# Patient Record
Sex: MALE | Race: WHITE
[De-identification: names, ages, dates, MRNs, and addresses within clinical notes are randomized per-mention and may not be internally consistent; named-entity substitution may affect disease eponyms.]

---

## 2020-10-19 ENCOUNTER — HOSPITAL ENCOUNTER (INPATIENT)
Dept: HOSPITAL 74 - YASAS | Age: 25
LOS: 2 days | Discharge: LEFT BEFORE DISCHARGE | DRG: 770 | End: 2020-10-21
Attending: ALLERGY & IMMUNOLOGY | Admitting: ALLERGY & IMMUNOLOGY
Payer: COMMERCIAL

## 2020-10-19 VITALS — BODY MASS INDEX: 21.2 KG/M2

## 2020-10-19 DIAGNOSIS — F10.220: ICD-10-CM

## 2020-10-19 DIAGNOSIS — F17.210: ICD-10-CM

## 2020-10-19 DIAGNOSIS — F14.20: ICD-10-CM

## 2020-10-19 DIAGNOSIS — F12.20: ICD-10-CM

## 2020-10-19 DIAGNOSIS — F32.9: ICD-10-CM

## 2020-10-19 DIAGNOSIS — F10.230: Primary | ICD-10-CM

## 2020-10-19 DIAGNOSIS — D72.829: ICD-10-CM

## 2020-10-19 DIAGNOSIS — F41.9: ICD-10-CM

## 2020-10-19 DIAGNOSIS — F19.282: ICD-10-CM

## 2020-10-19 DIAGNOSIS — F19.24: ICD-10-CM

## 2020-10-19 DIAGNOSIS — Z56.0: ICD-10-CM

## 2020-10-19 DIAGNOSIS — R63.4: ICD-10-CM

## 2020-10-19 LAB
ALBUMIN SERPL-MCNC: 3.9 G/DL (ref 3.4–5)
ALP SERPL-CCNC: 73 U/L (ref 45–117)
ALT SERPL-CCNC: 19 U/L (ref 13–61)
ANION GAP SERPL CALC-SCNC: 7 MMOL/L (ref 8–16)
AST SERPL-CCNC: 19 U/L (ref 15–37)
BILIRUB SERPL-MCNC: 0.6 MG/DL (ref 0.2–1)
BUN SERPL-MCNC: 13.3 MG/DL (ref 7–18)
CALCIUM SERPL-MCNC: 9 MG/DL (ref 8.5–10.1)
CHLORIDE SERPL-SCNC: 106 MMOL/L (ref 98–107)
CO2 SERPL-SCNC: 29 MMOL/L (ref 21–32)
CREAT SERPL-MCNC: 1 MG/DL (ref 0.55–1.3)
DEPRECATED RDW RBC AUTO: 12.3 % (ref 11.9–15.9)
GLUCOSE SERPL-MCNC: 91 MG/DL (ref 74–106)
HCT VFR BLD CALC: 37.6 % (ref 35.4–49)
HGB BLD-MCNC: 12.6 GM/DL (ref 11.7–16.9)
MCH RBC QN AUTO: 30.7 PG (ref 25.7–33.7)
MCHC RBC AUTO-ENTMCNC: 33.6 G/DL (ref 32–35.9)
MCV RBC: 91.5 FL (ref 80–96)
PLATELET # BLD AUTO: 363 K/MM3 (ref 134–434)
PMV BLD: 7 FL (ref 7.5–11.1)
POTASSIUM SERPLBLD-SCNC: 4.1 MMOL/L (ref 3.5–5.1)
PROT SERPL-MCNC: 7.2 G/DL (ref 6.4–8.2)
RBC # BLD AUTO: 4.11 M/MM3 (ref 4–5.6)
SODIUM SERPL-SCNC: 142 MMOL/L (ref 136–145)
WBC # BLD AUTO: 12.7 K/MM3 (ref 4–10)

## 2020-10-19 PROCEDURE — U0003 INFECTIOUS AGENT DETECTION BY NUCLEIC ACID (DNA OR RNA); SEVERE ACUTE RESPIRATORY SYNDROME CORONAVIRUS 2 (SARS-COV-2) (CORONAVIRUS DISEASE [COVID-19]), AMPLIFIED PROBE TECHNIQUE, MAKING USE OF HIGH THROUGHPUT TECHNOLOGIES AS DESCRIBED BY CMS-2020-01-R: HCPCS

## 2020-10-19 PROCEDURE — C9803 HOPD COVID-19 SPEC COLLECT: HCPCS

## 2020-10-19 RX ADMIN — HYDROXYZINE PAMOATE SCH: 25 CAPSULE ORAL at 22:34

## 2020-10-19 RX ADMIN — NICOTINE SCH: 7 PATCH TRANSDERMAL at 10:46

## 2020-10-19 RX ADMIN — Medication SCH TAB: at 10:45

## 2020-10-19 RX ADMIN — HYDROXYZINE PAMOATE SCH MG: 25 CAPSULE ORAL at 10:45

## 2020-10-19 RX ADMIN — Medication SCH: at 22:34

## 2020-10-19 RX ADMIN — Medication SCH: at 22:33

## 2020-10-19 RX ADMIN — HYDROXYZINE PAMOATE SCH: 25 CAPSULE ORAL at 17:44

## 2020-10-19 RX ADMIN — HYDROXYZINE PAMOATE SCH: 25 CAPSULE ORAL at 15:28

## 2020-10-19 SDOH — ECONOMIC STABILITY - INCOME SECURITY: UNEMPLOYMENT, UNSPECIFIED: Z56.0

## 2020-10-19 NOTE — HP
CIWA Score


Nausea/Vomitin-Mild Nausea/No Vomiting (patient is intoxicated)


Muscle Tremors: 2


Anxiety: 3


Agitation: 3


Paroxysmal Sweats: 1-Minimal Palms Moist


Orientation: 2-Disoriented Date<2 days


Tacttile Disturbances: 0-None


Auditory Disturbances: 0-None


Visual Disturbances: 0-None


Headache: 0-None Present


CIWA-Ar Total Score: 12





- Admission Criteria


[facility] Guidelines: Admission for Medically Managed Detox: 


Requires at least one of the followin. CIWA greater than 12


2. Seizures within the past 24 hours


3. Delirium tremens within the past 24 hours


4. Hallucinations within the past 24 hours


5. Acute intervention needed for co  occurring medical disorder


6. Acute intervention needed for co  occurring psychiatric disorder


7. Severe withdrawal that cannot be handled at a lower level of care (continued


    vomiting, continued diarrhea, abnormal vital signs) requiring intravenous


    medication and/or fluids


8. Pregnancy








Admitting History and Physical





- Smoking History


Smoking history: Current every day smoker


Have you smoked in the past 12 months: Yes


Aproximately how many cigarettes per day: 40





- Alcohol/Substance Use


Hx Alcohol Use: Yes





Admission ROS [facility]





- [facility] [place]


Chief Complaint: 





" I need help to stop drinking.  I can't keep doing this anymore."


Allergies/Adverse Reactions: 


                                    Allergies











Allergy/AdvReac Type Severity Reaction Status Date / Time


 


No Known Allergies Allergy   Verified 10/19/20 09:08











History of Present Illness: 





25 year old male with history of alcohol dependence with intoxication, cocaine 

use disorder, and cannabis use disorder and nicotine dependence.





- Substance Use History


  ** Alcohol


Substance amount: 5 pints vodka + beers


Frequency of use: More than 3 times per week


Date of Last Use: 10/19/20 (started age 14)


He admits to multiple blackouts and the need for an eye opener daily.





  ** Cocaine- Powder


Substance amount: $120-140


Frequency of use: Daily


Substance route: Inhalation (ex: sniffing or snorting)


Date of Last Use: 10/18/20 (started at age 14)





  ** Marijuana/Hashish


Substance amount: 2 blunts 


Substance route: Inhalation (ex: sniffing or snorting)


Date of Last Use: 10/18/20 (started age 14)





  ** Nicotine


Substance amount: 1/2 pack


Frequency of use: Daily


Substance route: Smoking


Date of Last Use: 10/19/20 (started age 14)





PMH:  None


Psurg:  None


Psych:  Impulsivity, Anxiety Disorder - never assessed


He is homeless in an [place] in the [place].  Has no legal issues pending.





ROBERT=0.122


CIWA=12





Patient meets criteria for detox as he has psych co-morbidity that has never 

been assess and behavioral impulsivity as exhibited by his short stay last 

visit.


He has agreed to contract for completion of detox at this time and will speak to

counseling prior to acting on impulses.





Exam Limitations: No Limitations





- Ebola screening


Have you traveled outside of the country in the last 21 days: No


Have you had contact with anyone from an Ebola affected area: No


Have you been sick,other than usual withdrawal symptoms: No


Do you have a fever: No





- Review of Systems


Constitutional: Diaphoresis, Unintentional Wgt. Loss


EENT: reports: No Symptoms Reported


Respiratory: reports: No Symptoms reported


Cardiac: reports: No Symptoms Reported


GI: reports: No Symptoms Reported


: reports: No Symptoms Reported


Musculoskeletal: reports: No Symptoms Reported


Integumentary: reports: No Symptoms Reported


Neuro: reports: No Symptoms reported


Endocrine: reports: No Symptoms Reported


Hematology: reports: No Symptoms Reported


Psychiatric: reports: Judgement Intact, Mood/Affect Appropiate, Orientated x3, 

Agitated, Anxious


Other Systems: Reviewed and Negative





Patient History





- Patient Medical History


Hx Anemia: No


Hx Asthma: No


Hx Chronic Obstructive Pulmonary Disease (COPD): No


Hx Cancer: No


Hx Cardiac Disorders: No


Hx Congestive Heart Failure: No


Hx Hypertension: No


Hx Hypercholesterolemia: No


Hx Pacemaker: No


HX Cerebrovascular Accident: No


Hx Seizures: No


Hx Dementia: No


Hx Diabetes: No


Hx Gastrointestinal Disorders: No


Hx Liver Disease: No


Hx Genitourinary Disorders: No


Hx Sexually Transmitted Disorders: No


Hx Renal Disease (ESRD): No


Hx Thyroid Disease: No


Hx Human Immunodeficiency Virus (HIV): No


Hx Hepatitis C: No


Hx Depression: Yes


Hx Suicide Attempt: No


Hx Bipolar Disorder: No


Hx Schizophrenia: No





- Patient Surgical History


Past Surgical History: No


Hx Neurologic Surgery: No


Hx Cataract Extraction: No


Hx Cardiac Surgery: No


Hx Lung Surgery: No


Hx Breast Surgery: No


Hx Breast Biopsy: No


Hx Abdominal Surgery: No


Hx Appendectomy: No


Hx Cholecystectomy: No


Hx Genitourinary Surgery: No


Hx  Section: No


Hx Orthopedic Surgery: No


Anesthesia Reaction: No





- PPD History


Previous Implant?: Yes


Implanted On Prior [facility] Admission?: Yes


Date: 20


Results: 0mm


PPD to be Administered?: No





- Smoking Cessation


Smoking history: Current every day smoker


Have you smoked in the past 12 months: Yes


Aproximately how many cigarettes per day: 40


Cigars Per Day: 0


Hx Chewing Tobacco Use: No


Initiated information on smoking cessation: Yes


'Breaking Loose' booklet given: 10/19/20





- Substances abused


  ** Alcohol


Substance route: Oral


Frequency: Daily


Amount used: 5 pints vodka + beers


Age of first use: 14


Date of last use: 10/19/20





  ** Cocaine


Substance route: Inhalation


Frequency: Daily


Amount used: $140


Age of first use: 14


Date of last use: 10/18/20





  ** Marijuana/Hashish


Other (specify): 2 joints


Substance route: Smoking


Frequency: Daily


Amount used: 2 joints


Age of first use: 14


Date of last use: 10/18/20





Admission Physical Exam [facility]





- Vital Signs


Vital Signs: 


                               Vital Signs - 24 hr











  10/19/20





  08:43


 


Temperature 96.7 F L


 


Pulse Rate 115 H


 


Respiratory 14





Rate 


 


Blood Pressure 111/67














- Physical


General Appearance: Yes: Thin, Irritable, Anxious


HEENTM: Yes: EOMI, Hearing grossly Normal, Normal ENT Inspection, Normocephalic,

Normal Voice, BREANNA, Pharynx Normal, Tm's normal


Respiratory: Yes: Chest Non-Tender, Lungs Clear, Normal Breath Sounds, No 

Respiratory Distress, No Accessory Muscle Use


Neck: Yes: No masses,lesions,Nodules, Supple, Trachea in good position


Breast: Yes: Within Normal Limits


Cardiology: Yes: Regular Rhythm, S1, S2, Tachycardia


Abdominal: Yes: Normal Bowel Sounds, Non Tender, Flat, Soft


Genitourinary: Yes: Within Normal Limits


Back: Yes: Normal Inspection


Musculoskeletal: Yes: full range of Motion, Gait Steady, Pelvis Stable


Extremities: Yes: Normal Capillary Refill, Normal Inspection, Normal Range of 

Motion, Non-Tender


Neurological: Yes: CNs II-XII NML intact, Fully Oriented, Alert, Motor Strength 

5/5, Normal Mood/Affect, Normal Response


Integumentary: Yes: Normal Color, Dry, Warm


Lymphatic: Yes: Within Normal Limits





- Diagnostic


(1) Alcohol intoxication


Current Visit: Yes   Status: Acute   





(2) Alcohol dependence with uncomplicated withdrawal


Current Visit: Yes   Status: Acute   





(3) Anxiety disorder


Current Visit: Yes   Status: Acute   





(4) Cannabis dependence


Current Visit: Yes   Status: Acute   





(5) Cocaine dependence


Current Visit: Yes   Status: Acute   


Qualifiers: 


   Substance use status: uncomplicated   Qualified Code(s): F14.20 - Cocaine 

dependence, uncomplicated   





(6) Depression


Current Visit: Yes   Status: Acute   





(7) Weight loss


Current Visit: Yes   Status: Acute   





Cleared for Admission [facility]





- Detox or Rehab


[facility] Level of Care: Medically Managed


Detox Regimen/Protocol: Librium


Claeared for Rehab Admission: No





Screened but not Admitted





- Documentation of Visit


Screened but not Admitted: No





Breathalyzer





- Breathalyzer


Breathalyzer: 0.122





Vital Signs





- Vital Signs


Vital signs refused: No


Temperature: 96.7 F


Pulse Rate: 115


Respiratory Rate: 14


Blood Pressure: 111/67


BP Location: Right Arm


Blood Pressure position: Sitting





- Height


Height: 5 ft 7 in





- Weight


Weight: 136 lb


Weight measurement method: Standing scale





- BMI


Body Mass Index (BMI): 21.2





- Bowel Function


Bowel Movement: No





Urine Drug Screen





- Test Device


Lot number: [id number]


Expiration date: 24





- Control


Is test valid?: Yes





- Results


Drug screen NEGATIVE: No


Urine drug screen results: THC-Marijuana, CHANTALE-Cocaine





Inpatient Rehab Admission





- Rehab Decision to Admit


Inpatient rehab admission?: No

## 2020-10-19 NOTE — XMS
Summarization Of Episode

                           Created on:2020



Patient:FAHAD ABBASI

Sex:Male

:1995

External Reference #:[id number]





Demographics







                          Address                   [address]



                                                    [place] [id number]

 

                          Home Phone                [phone]

 

                          Email Address             N

 

                          Preferred Language        spa

 

                          Marital Status             or 

 

                          Gnosticism Affiliation     NO

 

                          Race                      [facility]

 

                          Ethnic Group               or 









Author







                          [facility]ization              [facility]









Support







                Name            Relationship    Address         Phone

 

                HAM HONEYCUTT BROTHER         UNK             [phone]

[phone]



                                                [place] [address]   

 

                UE              Unavailable     Unavailable     Unavailable

 

                SERA HONEYCUTT MOTHER          [address] [phone]

[phone]



                                                [place], [place] 

 

                NA              Unavailable     Unavailable     Unavailable

 

                FLYNN ABHISASHA   PA              [address] [phone]



                                                [place] [address] 

 

                SERA SOUTH MOTHER          [address] [phone]

[phone]



                                                [place] 

 

                FLYNN SOUTH   Mother          [address] Unavailable



                                                [place] 









Care Team Providers







                    Name                Role                Phone

 

                    Dozier,  C          Unavailable         Unavailable

 

                    Dozier,  C          Unavailable         Unavailable

 

                    Dozier,  C          Unavailable         Unavailable

 

                    Dozier,  C          Unavailable         Unavailable

 

                    Dozier,  C          Unavailable         Unavailable

 

                    Dozier,  C          Unavailable         Unavailable

 

                    Dozier,  C          Unavailable         Unavailable

 

                    Dozier,  C          Unavailable         Unavailable

 

                    Dozier,  C          Unavailable         Unavailable

 

                    Ki              Unavailable         [phone]

 

                    RAKEL MAVIS         Unavailable         Unavailable

 

                    Aszalos,  Kathleen      Unavailable         Unavailable

 

                    Aszalos,  Kathleen      Unavailable         Unavailable

 

                    Aszalos,  Kathleen      Unavailable         Unavailable

 

                    Aszalos,  Kathleen      Unavailable         Unavailable

 

                    Aszalos,  Kathleen      Unavailable         Unavailable

 

                    Aszalos,  Kathleen      Unavailable         Unavailable

 

                    Aszalos,  Kathleen      Unavailable         Unavailable

 

                    Aszalos,  Kathleen      Unavailable         Unavailable

 

                    Aszalos,  Kathleen      Unavailable         Unavailable









Re-disclosure Warning




Family History







           Family Member Family Member Family Member Date of    Description Data

 Source(s)



           Name       Gender     Status     Status                

 

           Unknown    Male       Diagnosis  2019            [facility] ([facility]



                                            12[facility]00 [facility]            [facility]

al



                                            EDT                   [place])

 

           Unknown    Male       Diagnosis  2019            [facility] ([facility]



                                            12:[facility]:00 [facility]            [facility]

al



                                            EDT                   [place])







Encounters







           Encounter  Providers  Location   Date       Indications Data Source(s

)

 

                      Attender: Jame Positive   10/08/201             [facility] (

[facility]



                      [facility] [address]                     My



                                            07:34:00              Medical



                                            PM EDT -              Center)



                                            10/08/201             



                                            9                     



                                            07:34:00              



                                            PM EDT                

 

                      Attender: Jame Positive   10/07/201             [facility] (

[facility]



                      [facility] [address] [address]                     My



                                            07:54:00              Medical



                                            PM EDT -              Center)



                                            10/07/201             



                                            9                     



                                            07:54:00              



                                            PM EDT                

 

                      Attender: Jame Positive   10/03/201             [facility] (

[facility]



                      [facility] [address]                     My



                                            07:29:00              Medical



                                            PM EDT -              Center)



                                            10/03/201             



                                            9                     



                                            07:29:00              



                                            PM EDT                

 

                      Attender: [facility] 10/02/201             [facility] 

([facility]     9                     My



                                            03:01:00              Medical



                                            PM EDT -              Center)



                                            10/02/201             



                                            9                     



                                            03:01:00              



                                            PM EDT                

 

                      Attender: Jame Positive   10/01/201             [facility] (

[facility]



                      [facility] [address]                     My



                                            08:02:00              Medical



                                            PM EDT -              Center)



                                            10/01/201             



                                            9                     



                                            08:02:00              



                                            PM EDT                

 

                      Attender: Jame Positive   10/01/201             [facility] (

[facility]



                      [facility] [address]                     My



                                            07:59:00              Medical



                                            PM EDT -              Center)



                                            10/01/201             



                                            9                     



                                            07:59:00              



                                            PM EDT                

 

           Outpatient                       10/01/201             [facility]



                                            01:30:00              



                                            PM EDT                

 

           Outpatient                       10/01/201             [facility]



                                            12:00:00              



                                            AM EDT                

 

           Individual Attender: Jame Positive                [facility] (

[facility]



           Psychotherapy ([address]                     Major

s



           Min)                             06:50:00              Medical



                                            PM EDT -              Center)



                                                         



                                            9                     



                                            06:50:00              



                                            PM EDT                

 

                      Attender: Jame Positive                [facility] (

[facility]



                      [facility] [address]                     My



                                            02:22:00              Medical



                                            PM EDT -              Center)



                                                         



                                            9                     



                                            02:22:00              



                                            PM EDT                

 

                      Attender: Jame Positive                [facility] (

[facility]



                      [facility] [address]                     My



                                            06:58:00              Medical



                                            PM EDT -              Center)



                                                         



                                            9                     



                                            06:58:00              



                                            PM EDT                

 

                      Attender: Jame Positive                [facility] (

[facility]



                      [facility] [address]                     My



                                            06:41:00              Medical



                                            PM EDT -              Center)



                                                         



                                            9                     



                                            06:41:00              



                                            PM EDT                

 

           GROUP PSYCHOTHERAPY Attender: Jame Positive                

[facility] ([facility]



                      [facility] [address]                     My



                                            07:43:00              Medical



                                            PM EDT -              Center)



                                                         



                                            9                     



                                            07:43:00              



                                            PM EDT                

 

                      Attender: Jame Positive                [facility] (

[facility]



                      [facility] [address]                     My



                                            07:55:00              Medical



                                            PM EDT -              Center)



                                                         



                                            9                     



                                            07:55:00              



                                            PM EDT                

 

           Individual Attender: Jame Positive                [facility] (

[facility]



           Psychotherapy ([address]                     Major

s



           Min)                             07:47:00              Medical



                                            PM EDT -              Center)



                                                         



                                            9                     



                                            07:47:00              



                                            PM EDT                

 

                      Attender: Jame Positive                [facility] (

[facility]



                      [facility] [address]                     My



                                            08:12:00              Medical



                                            PM EDT -              Center)



                                                         



                                            9                     



                                            08:12:00              



                                            PM EDT                

 

                      Attender: Jame Positive                [facility] (

[facility]



                      [facility] [address]                     My



                                            07:51:00              Medical



                                            PM EDT -              Center)



                                                         



                                            9                     



                                            07:51:00              



                                            PM EDT                

 

                      Attender: Jame Positive   09/10/201             [facility] (

[facility]



                      [facility] [address]                     My



                                            08:17:00              Medical



                                            PM EDT -              Center)



                                            09/10/201             



                                            9                     



                                            08:17:00              



                                            PM EDT                

 

                      Attender: Jame Positive   09/10/201             [facility] (

[facility]



                      [facility] [address]                     My



                                            08:12:00              Medical



                                            PM EDT -              Center)



                                            09/10/201             



                                            9                     



                                            08:12:00              



                                            PM EDT                

 

           Outpatient                                    [facility]



                                            09:52:00              



                                            AM EDT                

 

           Outpatient                                    [facility]



                                            12:00:00              



                                            AM EDT                

 

           GROUP PSYCHOTHERAPY Attender: Jame Positive                

[facility] ([facility]



                      [facility] [address]                     My



                                            07:22:00              Medical



                                            PM EDT -              Center)



                                                         



                                            9                     



                                            07:22:00              



                                            PM EDT                

 

                      Attender: [facility]              [facility]

N ([facility]     9                     My



                                            08:55:00              Medical



                                            AM EDT -              Center)



                                                         



                                            9                     



                                            08:55:00              



                                            AM EDT                

 

                      Attender: Jame Positive                [facility] (

[facility]



                      [facility] [address]                     My



                                            07:44:00              Medical



                                            PM EDT -              Center)



                                                         



                                            9                     



                                            07:44:00              



                                            PM EDT                

 

                      Attender: Jame Positive                [facility] (

[facility]



                      [facility] [address]                     My



                                            07:30:00              Medical



                                            PM EDT -              Center)



                                                         



                                            9                     



                                            07:30:00              



                                            PM EDT                

 

           Outpatient                                    [facility]



                                            12:07:00              



                                            PM EDT                

 

           Outpatient                                    [facility]



                                            12:00:00              



                                            AM EDT                

 

           Psychiatric Attender: Jame Positive                [facility] 

([facility]



           [facility] [address]                     [facility]



           Interview (45+ Min)                       10:18:00              Medic

al



                                            AM EDT -              Center)



                                                         



                                            9                     



                                            10:18:00              



                                            AM EDT                

 

           Outpatient Attender: MAVIS H                       [facility]

[facility]



                      RAKEL                 [address] [place] [address]



                      ARNABAdmitter:            12:00:00              



                      MAVIS RAKEL            PM EDT                



                      MAVIS                                       







Insurance Providers







          Payer name Policy type Policy ID Covered   Covered party's Policy    P

anatoly



                    / Coverage           party ID  relationship to [employer]    Inf

ormation



                    type                          dooley              

 

          [employer]           [id number]            SP                  [id number]

S

 

          [employer]           [id number]            SP                  [id number]

S

 

          MEDICAID            [id number]            SP                  [id number]

 

          SELF PAY                                SP                  



          INSURANCE                                                   

 

          [employer] MEDICAID           [id number]           SP                  [id number]

002



          COMM PLAN                                                   

 

          [facility] [employer]           [place]

 

          [facility] [employer] O         [id number]           [id number]                  [id number]

[id number]

 

                    O                                                 

 

                                                                      

 

          [employer] [place]           [id number]           SP                  [id number]

[id number]



          COMPLETE                                                    

 

                    W         [id number]            [id number]                  [id number]







Surgeries/Procedures







             Procedure    Description  Date         Indications  Data Source(s)

 

             Individual Psychotherapy              2019                [facility]

[facility] ([facility]



             (30 Min)                  12:00:00 AM EDT              [facility]

jalil



                                       - 2019              [place])



                                       12:00:00 AM EDT              

 

             GROUP PSYCHOTHERAPY              2019                [facility] 

([facility]



                                       12:00:00 AM EDT              [facility] 2019              [place])



                                       12:00:00 AM EDT              

 

             Individual Psychotherapy              2019                [facility]

[facility] ([facility]



             (30 Min)                  12:00:00 AM EDT              [facility]

jalil



                                       - 2019              [place])



                                       12:00:00 AM EDT              

 

             [facility]APY              2019                [facility] 

([facility]



                                       12:00:00 AM EDT              [facility] Med[facility]

jalil



                                       - 2019              [place])



                                       12:00:00 AM EDT              

 

             Psychiatric Diagnostic              2019                [facility]

EN ([facility]



             Interview (45+ Min)              12:00:00 AM EDT              [facility]              [place])



                                       12:00:00 AM EDT              







Results







                    ID                  Date                Data Source

 

                    [id number]         2020 04:52:00 PM EDT [facility]











          Name      Value     Range     Interpretation Description Data      Sup

porting



                                        Code                Source(s) Document(s

)

 

          SARS                                              [facility]   



          coronavirus 2                                                   



          RNA                                                         









                                        This lab was ordered by [facility] Lakeisha Coronel and reported by [facility].











                                        Procedure

 

                                        







Social History







           Code       Duration   Value      Status     Description Data Source(s

)

 

           Caffeine Use 10/08/2019            completed             [facility] ([facility]

nt



           Details    12:00:00 AM EDT                                  [facility])

 

           Smoking    10/08/2019 Unknown if completed  Unknown if ever [facility] (

[facility]



                      12:00:00 AM EDT ever smoked            smoked     [facility])







Vital Signs







                    ID                  Date                Data Source

 

                    UNK                                     











           Name       Value      Range      Interpretation Code Description Data

 Source(s)

 

           Body mass index 19.53 kg/m2                       19.53 kg/m2 [facility]

 ([facility]



           (BMI) [Ratio]                                             [facility])

 

           Respiratory rate 14 /min                          14 /min    [facility] 

([facility])

 

           Body temperature 36.67 Elo[place]                        36.67 Eloina  [facility] 

([facility])

 

           Heart rate 80 /min                          80 /min    [facility] ([facility])

 

           Diastolic blood 70 mm[Hg]                        70 mm[Hg]  [facility] (

[facility]



           pressure                                               [facility])

 

           Systolic blood 110 mm[Hg]                       110 mm[Hg] [facility] ([facility]

aint



           pressure                                               [facility])

 

           Body weight 63.503 kg                        63.503 kg  [facility] ([facility])

 

           Body height 180.34 cm                        180.34 cm  [facility] ([facility])

## 2020-10-19 NOTE — XMS
Summarization Of Episode

                           Created on:2020



Patient:FAHAD ABBASI

Sex:Male

:1995

External Reference #:[id number]





Demographics







                          Address                   [address]



                                                    [place] [id number]

 

                          Home Phone                [phone]

 

                          Email Address             N

 

                          Preferred Language        spa

 

                          Marital Status             or 

 

                          Episcopalian Affiliation     NO

 

                          Race                      [facility]

 

                          Ethnic Group               or 









Author







                          Organization              [facility]









Support







                Name            Relationship    Address         Phone

 

                HAM HONEYCUTT BROTHER         UNK             [phone]

[phone]



                                                [place] [address]   

 

                UE              Unavailable     Unavailable     Unavailable

 

                SERA HONEYCUTT MOTHER          [address] [phone]

[phone]



                                                [place], [place] 

 

                NA              Unavailable     Unavailable     Unavailable

 

                FLYNN ABHISASHA   PA              [address] [phone]



                                                [place] [address] 

 

                SERA SOUTH MOTHER          [address] [phone]

[phone]



                                                [place] 

 

                FLYNN SOUTH   Mother          [address] Unavailable



                                                [place] 









Care Team Providers







                    Name                Role                Phone

 

                    Dozier,  C          Unavailable         Unavailable

 

                    Dozier,  C          Unavailable         Unavailable

 

                    Dozier,  C          Unavailable         Unavailable

 

                    Dozier,  C          Unavailable         Unavailable

 

                    Dozier,  C          Unavailable         Unavailable

 

                    Dozier,  C          Unavailable         Unavailable

 

                    Dozier,  C          Unavailable         Unavailable

 

                    Dozier,  C          Unavailable         Unavailable

 

                    Dozier,  C          Unavailable         Unavailable

 

                    Ki              Unavailable         [phone]

 

                    RAKEL MAVIS         Unavailable         Unavailable

 

                    Aszalos,  Kathleen      Unavailable         Unavailable

 

                    Aszalos,  Kathleen      Unavailable         Unavailable

 

                    Aszalos,  Kathleen      Unavailable         Unavailable

 

                    Aszalos,  Kathleen      Unavailable         Unavailable

 

                    Aszalos,  Kathleen      Unavailable         Unavailable

 

                    Aszalos,  Kathleen      Unavailable         Unavailable

 

                    Aszalos,  Kathleen      Unavailable         Unavailable

 

                    Aszalos,  Kathleen      Unavailable         Unavailable

 

                    Aszalos,  Kathleen      Unavailable         Unavailable









Re-disclosure Warning

The records that you are about to access may contain information from federally-
assisted alcohol or drug abuse programs. If such information is present, then 
the following federally mandated warning applies: This information has been 
disclosed to you from records protected by federal confidentiality rules (42 CFR
part 2). The federal rules prohibit you from making any further disclosure of 
this information unless further disclosure is expressly permitted by the written
consent of the person to whom it pertains or as otherwise permitted by 42 CFR 
part 2. A general authorization for the release of medical or other information 
is NOT sufficient for this purpose. The Federal rules restrict any use of the 
information to criminally investigate or prosecute any alcohol or drug abuse 
patient.The records that you are about to access may contain highly sensitive 
health information, the redisclosure of which is protected by Article 27-F of 
the [facility] Public Health law. If you continue you may haveaccess to 
information: Regarding HIV / AIDS; Provided by facilities licensed or operated 
by the [facility] Office of Mental Health; or Provided by the [facility]
Office for People With Developmental Disabilities. If such information is 
present, then the following [place] mandated warning applies: This 
information has been disclosed to you from confidential records which are 
protected by state law. State law prohibits you from making any further 
disclosure of this information without the specific written consent of the 
person to whom it pertains, or as otherwise permitted by law. Any unauthorized 
further disclosure in violation of state law may result in a fine or correction 
sentence or both. A general authorization for the release of medical or other 
information is NOT sufficient authorization for further disclosure.



Family History







           Family Member Family Member Family Member Date of    Description Data

 Source(s)



           Name       Gender     Status     Status                

 

           Unknown    Male       Diagnosis  2019            [facility]



                                            12:00:00 AM            [facility]

al



                                            EDT                   [place])

 

           Unknown    Male       Diagnosis  2019            [facility]



                                            12:00:00 AM            [facility]

al



                                            EDT                   [place])







Encounters







           Encounter  Providers  Location   Date       Indications Data Source(s

)

 

                      Attender: Jame Positive   10/08/201             [facility]

[facility]



                      [place] [facility]                     My



                                            07:34:00              Medical



                                            PM EDT -              Center)



                                            10/08/201             



                                            9                     



                                            07:34:00              



                                            PM EDT                

 

                      Attender: Jame Positive   10/07/201             [facility] (

[facility]



                      [place] [facility]                     My



                                            07:54:00              Medical



                                            PM EDT -              Center)



                                            10/07/201             



                                            9                     



                                            07:54:00              



                                            PM EDT                

 

                      Attender: Jame Positive   10/03/201             [facility] (

[facility]



                      [place] [facility]                     My



                                            07:29:00              Medical



                                            PM EDT -              Center)



                                            10/03/201             



                                            9                     



                                            07:29:00              



                                            PM EDT                

 

                      Attender: [facility] 10/02/201             [facility] 

([facility]     9                     My



                                            03:01:00              Medical



                                            PM EDT -              Center)



                                            10/02/201             



                                            9                     



                                            03:01:00              



                                            PM EDT                

 

                      Attender: Jame Positive   10/01/201             [facility] (

[facility]



                      [place] [facility]                     My



                                            08:02:00              Medical



                                            PM EDT -              Center)



                                            10/01/201             



                                            9                     



                                            08:02:00              



                                            PM EDT                

 

                      Attender: Jame Positive   10/01/201             [facility] (

[facility]



                      [place] [place] [facility]                     My



                                            07:59:00              Medical



                                            PM EDT -              Center)



                                            10/01/201             



                                            9                     



                                            07:59:00              



                                            PM EDT                

 

           Outpatient                       10/01/201             [facility]



                                            01:30:00              



                                            PM EDT                

 

           Outpatient                       10/01/201             [facility]



                                            12:00:00              



                                            AM EDT                

 

           Individual Attender: Jame Positive                [facility] (

[facility]



           Psychotherapy ([address] [place] [facility] [address] [address]                     Major

s



           Min)                             06:50:00              Medical



                                            PM EDT -              Center)



                                                         



                                            9                     



                                            06:50:00              



                                            PM EDT                

 

                      Attender: Jame Positive                [facility] (

[facility]



                      [place] [facility]                     My



                                            02:22:00              Medical



                                            PM EDT -              Center)



                                                         



                                            9                     



                                            02:22:00              



                                            PM EDT                

 

                      Attender: Jame Positive                [facility] (

[facility]



                      [place] [facility]                     My



                                            06:58:00              Medical



                                            PM EDT -              Center)



                                                         



                                            9                     



                                            06:58:00              



                                            PM EDT                

 

                      Attender: Jame Positive                [facility] (

[facility]



                      [place] [facility]                     My



                                            06:41:00              Medical



                                            PM EDT -              Center)



                                                         



                                            9                     



                                            06:41:00              



                                            PM EDT                

 

           GROUP PSYCHOTHERAPY Attender: Jame Positive                

[facility] ([facility]



                      [place] [facility]                     My



                                            07:43:00              Medical



                                            PM EDT -              Center)



                                                         



                                            9                     



                                            07:43:00              



                                            PM EDT                

 

                      Attender: Jame Positive                [facility] (

[facility]



                      [place] [facility]                     My



                                            07:55:00              Medical



                                            PM EDT -              Center)



                                                         



                                            9                     



                                            07:55:00              



                                            PM EDT                

 

           Individual Attender: Jame Positive                [facility]

[facility]



           Psychotherapy ([address] [place] [facility] [address]                     Major

s



           Min)                             07:47:00              Medical



                                            PM EDT -              Center)



                                                         



                                            9                     



                                            07:47:00              



                                            PM EDT                

 

                      Attender: Jame Positive                [facility] (

[facility]



                      [place] [facility]                     My



                                            08:12:00              Medical



                                            PM EDT -              Center)



                                                         



                                            9                     



                                            08:12:00              



                                            PM EDT                

 

                      Attender: Jame Positive                [facility] (

[facility]



                      [place] [facility]                     My



                                            07:51:00              Medical



                                            PM EDT -              Center)



                                                         



                                            9                     



                                            07:51:00              



                                            PM EDT                

 

                      Attender: Jame Positive   09/10/201             [facility] (

[facility]



                      [place] [facility]                     My



                                            08:17:00              Medical



                                            PM EDT -              Center)



                                            09/10/201             



                                            9                     



                                            08:17:00              



                                            PM EDT                

 

                      Attender: Jame Positive   09/10/201             [facility] (

[facility]



                      [place] [facility]                     My



                                            08:12:00              Medical



                                            PM EDT -              Center)



                                            09/10/201             



                                            9                     



                                            08:12:00              



                                            PM EDT                

 

           Outpatient                                    [facility]



                                            09:52:00              



                                            AM EDT                

 

           Outpatient                                    [facility]



                                            12:00:00              



                                            AM EDT                

 

           GROUP PSYCHOTHERAPY Attender: Jame Positive                

[facility] ([facility]



                      [place] [facility]                     My



                                            07:22:00              Medical



                                            PM EDT -              Center)



                                                         



                                            9                     



                                            07:22:00              



                                            PM EDT                

 

                      Attender: [facility]              [facility]

N ([facility]     9                     My



                                            08:55:00              Medical



                                            AM EDT -              Center)



                                                         



                                            9                     



                                            08:55:00              



                                            AM EDT                

 

                      Attender: Jame Positive                [facility] (

[facility]



                      [place] [facility]                     My



                                            07:44:00              Medical



                                            PM EDT -              Center)



                                                         



                                            9                     



                                            07:44:00              



                                            PM EDT                

 

                      Attender: Jame Positive                [facility] (

[facility]



                      [place] [facility]                     My



                                            07:30:00              Medical



                                            PM EDT -              Center)



                                                         



                                            9                     



                                            07:30:00              



                                            PM EDT                

 

           Outpatient                                    [facility]



                                            12:07:00              



                                            PM EDT                

 

           Outpatient                                    [facility]



                                            12:00:00              



                                            AM EDT                

 

           Psychiatric Attender: Jame Positive                [facility] 

([facility]



           [facility] [place] [facility]                     [facility]



           Interview (45+ Min)                       10:18:00              Medic

al



                                            AM EDT -              Center)



                                                         



                                            9                     



                                            10:18:00              



                                            AM EDT                

 

           Outpatient Attender: MAVIS H                       [facility]

[facility]



                      RAKEL                 [address] [place] [address]



                      ARNABAdmitter:            12:00:00              



                      MAVIS RAKEL            PM EDT                



                      MAVIS                                       







Insurance Providers







          Payer name Policy type Policy ID Covered   Covered party's Policy    P

anatoly



                    / Coverage           party ID  relationship to [employer]    Inf

ormation



                    type                          dooley              

 

          [employer]           [id number]            SP                  [id number]

S

 

          [employer]           [id number]            SP                  [id number]

S

 

          MEDICAID            [id number]            SP                  [id number]

 

          SELF PAY                                SP                  



          INSURANCE                                                   

 

          [employer] MEDICA[employer]           [id number]           SP                  [id number]

002



          COMM PLAN                                                   

 

          [facility] ESSENTIALS           [place]               self                [place]

 

          [facility] ESSENTIALS O         [id number]           [id number]                  [id number]

[id number]

 

                    O                                                 

 

                                                                      

 

          [employer] [place] D[place]           [id number]           [id number]                  [id number]

[id number]



          COMPLETE                                                    

 

                    W         [id number]            [id number]                  [id number]







Surgeries/Procedures







             Procedure    Description  Date         Indications  Data Source(s)

 

             Individual Psychotherapy              2019                [facility]

[facility] ([facility]



             (30 Min)                  12:00:00 AM EDT              [facility]

jalil



                                       - 2019              [place])



                                       12:00:00 AM EDT              

 

             GROUP PSYCHOTHERAPY              2019                [facility] 

([facility]



                                       12:00:00 AM EDT              [facility] 2019              [place])



                                       12:00:00 AM EDT              

 

             Individual Psychotherapy              2019                [facility]

[facility] ([facility]



             (30 Min)                  12:00:00 AM EDT              [facility]

jalil



                                       - 2019              [place])



                                       12:00:00 AM EDT              

 

             [facility]              [facility]



                                       12:00:00 AM EDT              [facility]

jalil



                                       - 2019              [place])



                                       12:00:00 AM EDT              

 

             Psychiatric Diagnostic              2019                [facility]

EN ([facility]



             Interview (45+ Min)              12:00:00 AM EDT              [facility]              [place])



                                       12:00:00 AM EDT              







Results







                    ID                  Date                Data Source

 

                    [id number]         2020 04:52:00 PM EDT [facility]











          Name      Value     Range     Interpretation Description Data      Sup

porting



                                        Code                Source(s) Document(s

)

 

          [facility]                                              [facility]   



          coronavirus 2                                                   



          RNA                                                         









                                        This lab was ordered by [facility] Lakeisha Coronel and reported by [facility].











                                        Procedure

 

                                        







Social History







           Code       Duration   Value      Status     Description Data Source(s

)

 

           Caffeine Use 10/08/2019            completed             NEXTGEN ([facility]

nt



           Details    12:00:00 AM EDT                                  [facility])

 

           Smoking    10/08/2019 Unknown if completed  Unknown if ever [facility] (

[facility]



                      12:00:00 AM EDT ever smoked            smoked     [facility])







Vital Signs







                    ID                  Date                Data Source

 

                    UNK                                     











           Name       Value      Range      Interpretation Code Description Data

 Source(s)

 

           Body mass index 19.53 kg/m2                       19.53 kg/m2 [facility]

 ([facility]



           (BMI) [Ratio]                                             [facility])

 

           Respiratory rate 14 /min                          14 /min    [facility] 

([facility])

 

           Body temperature 36.67 Elo[place]                        36.67 Eloina  [facility] 

([facility])

 

           Heart rate 80 /min                          80 /min    [facility] ([facility])

 

           Diastolic blood 70 mm[Hg]                        70 mm[Hg]  [facility] (

[facility]



           pressure                                               [facility])

 

           Systolic blood 110 mm[Hg]                       110 mm[Hg] [facility] ([facility]

aint



           pressure                                               [facility])

 

           Body weight 63.503 kg                        63.503 kg  [facility] ([facility])

 

           Body height 180.34 cm                        180.34 cm  [facility] ([facility])

## 2020-10-19 NOTE — BHS.RME
2019 N Coronavirus Screen





- COVID-19 Screening Questions


Dx of COVID-19 or had a positive test in the last 4 weeks?: No


Contact with known/suspected COVID patient in last 14 days?: No


Any of these symptoms or contact with someone who has?: None


Traveled domestically/internationally in the last 14 days?: No


Screen score: 0


Screen result: Further Evaluation





Substance Use & Tx History





- Substance Use History


  ** Alcohol


Substance amount: 5 pints vodka + beers


Frequency of use: More than 3 times per week


Date of Last Use: 10/19/20 (started age 14)





  ** Cocaine- Powder


Substance amount: $120-140


Frequency of use: Daily


Substance route: Inhalation (ex: sniffing or snorting)


Date of Last Use: 10/18/20 (started at age 14)





  ** Marijuana/Hashish


Substance amount: 2 blunts 


Substance route: Inhalation (ex: sniffing or snorting)


Date of Last Use: 10/18/20 (started age 14)





  ** Nicotine


Substance amount: 1/2 pack


Frequency of use: Daily


Substance route: Smoking


Date of Last Use: 10/19/20 (started age 14)





Physical/Psych/Mental Status





- Behavior


General Behavior: Increased activity (restlessness, agitation)


Eye Contact: Normal





- Cooperativeness


Cooperativeness: Cooperative





- Thinking


Thought Processes: Tight, Logical, Goal Directed





- Physical Health Problems


Is patient presently having any pain?: No


Does patient presently have any injuries (include location): No


Does patient currently have a fever: No


Is patient pregnant: No





CIWA


Nausea/Vomitin-Mild Nausea/No Vomiting (patient is intoxicated)


Muscle Tremors: 2


Anxiety: 3


Agitation: 3


Paroxysmal Sweats: 1-Minimal Palms Moist


Orientation: 2-Disoriented Date<2 days


Tacttile Disturbances: 0-None


Auditory Disturbances: 0-None


Visual Disturbances: 0-None


Headache: 0-None Present


CIWA-Ar Total Score: 12

## 2020-10-20 RX ADMIN — HYDROXYZINE PAMOATE SCH: 25 CAPSULE ORAL at 10:27

## 2020-10-20 RX ADMIN — HYDROXYZINE PAMOATE SCH MG: 25 CAPSULE ORAL at 17:28

## 2020-10-20 RX ADMIN — Medication SCH MG: at 22:07

## 2020-10-20 RX ADMIN — HYDROXYZINE PAMOATE SCH: 25 CAPSULE ORAL at 06:36

## 2020-10-20 RX ADMIN — HYDROXYZINE PAMOATE SCH MG: 25 CAPSULE ORAL at 22:08

## 2020-10-20 RX ADMIN — NICOTINE SCH: 7 PATCH TRANSDERMAL at 10:26

## 2020-10-20 RX ADMIN — Medication SCH TAB: at 10:26

## 2020-10-20 RX ADMIN — HYDROXYZINE PAMOATE SCH: 25 CAPSULE ORAL at 13:53

## 2020-10-20 NOTE — PN
S CIWA





- CIWA Score


Nausea/Vomitin-Mild Nausea/No Vomiting


Muscle Tremors: 2


Anxiety: 3


Agitation: 1-Slight > Activity


Paroxysmal Sweats: No Perspiration


Orientation: 0-Oriented


Tacttile Disturbances: 0-None


Auditory Disturbances: 0-None


Visual Disturbances: 2-Mild Sensitivity


Headache: 1-Very Mild


CIWA-Ar Total Score: 10





BHS Progress Note (SOAP)


Subjective: 





25 years old male was admitted on 10/19/20 for alcohol withdrawal sx management 

treating with librium detox regiment


bmi 21.3 ensure 120 ml po tid with meals


resting in bed limited conversation with staff 


Objective: 





10/20/20 10:48


                               Vital Signs - 24 hr











  10/19/20 10/19/20 10/19/20





  12:46 16:54 21:09


 


Temperature 98.4 F 98.2 F 98.2 F


 


Pulse Rate 96 H 79 65


 


Respiratory 20 16 16





Rate   


 


Blood Pressure 107/49 L 118/60 102/55 L


 


O2 Sat by Pulse 97  97





Oximetry (%)   














  10/20/20





  08:40


 


Temperature 98 F


 


Pulse Rate 49 L


 


Respiratory 20





Rate 


 


Blood Pressure 103/59 L


 


O2 Sat by Pulse 





Oximetry (%) 








                                Laboratory Tests











  10/19/20 10/19/20 10/19/20





  10:00 10:00 10:00


 


WBC  12.7 H  


 


RBC  4.11  


 


Hgb  12.6  


 


Hct  37.6  


 


MCV  91.5  


 


MCH  30.7  


 


MCHC  33.6  


 


RDW  12.3  


 


Plt Count  363  


 


MPV  7.0 L  


 


Sodium   


 


Potassium   


 


Chloride   


 


Carbon Dioxide   


 


Anion Gap   


 


BUN   


 


Creatinine   


 


Est GFR (CKD-EPI)AfAm   


 


Est GFR (CKD-EPI)NonAf   


 


Random Glucose   


 


Calcium   


 


Total Bilirubin   


 


AST   


 


ALT   


 


Alkaline Phosphatase   


 


Total Protein   


 


Albumin   


 


Syphilis Serology   Non-reactive 


 


COVID-19 ([facility])    Not detected














  10/19/20





  10:35


 


WBC 


 


RBC 


 


Hgb 


 


Hct 


 


MCV 


 


MCH 


 


MCHC 


 


RDW 


 


Plt Count 


 


MPV 


 


Sodium  142


 


Potassium  4.1


 


Chloride  106


 


Carbon Dioxide  29


 


Anion Gap  7 L


 


BUN  13.3


 


Creatinine  1.0


 


Est GFR (CKD-EPI)AfAm  120.70


 


Est GFR (CKD-EPI)NonAf  104.14


 


Random Glucose  91


 


Calcium  9.0


 


Total Bilirubin  0.6


 


AST  19


 


ALT  19


 


Alkaline Phosphatase  73


 


Total Protein  7.2


 


Albumin  3.9


 


Syphilis Serology 


 


COVID-19 ([facility]) 











10/20/20 10:50


wbc elevation repeat cbc


Assessment: 





10/20/20 10:50


alcohol withdrawal 


Plan: 





librium regiment

## 2020-10-20 NOTE — CONSULT
[facility]HS Psychiatric Consult





- Data


Date of interview: 10/20/20


Admission source: Self-referred


Identifying data: Mr Weaver  is a 25 years old single  male, father of 

2 children, unemployed with no source of income, homeless living in an [place] in [facility] seeking detox treatment for alcohol, cocaine and cannabis


Substance Abuse History: Reports history of alcohol, cocaine and marijuana use, 

Refer to addition counselor's summary for further information


Medical History: Unremarkable. Smokes 10 cigarettes daily


Psychiatric History: Patient is known for five previous admissions to this [facility]. Contrary to information provided during his recent admissin to this 

facility in  August 2020, He reports that back in his native [place], [place], he saw a psychiatrist for 2 years for the treatment of anxiety. He 

does not recall date of treatment nor name of medication he was prescribed.  He 

denies  previous psychiatric treatment or suicidal attempt. At present, denies 

experiencing anxiety symptoms, S/H ideations. However, reports sleeping poorly


Physical/Sexual Abuse/Trauma History: Denies





Mental Status Exam





- Mental Status Exam


Alert and Oriented to: Time, Place, Person


Cognitive Function: Fair


Patient Appearance: Well Groomed


Mood: Hopeful, Euthymic


Affect: Appropriate


Patient Behavior: Cooperative


Speech Pattern: Clear


Voice Loudness: Normal


Thought Process: Intact, Goal Oriented


Thought Disorder: Not Present


Hallucinations: Denies


Suicidal Ideation: Denies


Homicidal Ideation: Denies


Insight/Judgement: Poor


Sleep: Poorly


Appetite: Good


Muscle strength/Tone: Normal


Gait/Station: Normal





Psychiatric Findings





- Problem List (Axis 1, 2,3)


(1) Anxiety disorder


Current Visit: Yes   Status: Chronic   





(2) Substance-induced anxiety disorder


Current Visit: Yes   Status: Ruled-out   





(3) Substance-induced sleep disorder


Current Visit: Yes   Status: Acute   





(4) Alcohol dependence with uncomplicated withdrawal


Current Visit: Yes   Status: Acute   





(5) Cocaine dependence


Current Visit: Yes   Status: Acute   


Qualifiers: 


   Substance use status: uncomplicated   Qualified Code(s): F14.20 - Cocaine 

dependence, uncomplicated   





(6) Cannabis dependence


Current Visit: Yes   Status: Acute   





(7) Nicotine dependence


Current Visit: No   Status: Chronic   


Qualifiers: 


   Nicotine product type: cigarettes   Substance use status: uncomplicated   

Qualified Code(s): F17.210 - Nicotine dependence, cigarettes, uncomplicated   





- Initial Treatment Plan


Initial Treatment Plan: 1) Continue Melatonin 5 mg/hs prn for insomnia and 

Hydroxyzine 25 mg po q 4hrs prn for anxiety ordered by faclity medical provider.

 2) Continue inpatient detoxification

## 2020-10-21 VITALS — HEART RATE: 88 BPM | TEMPERATURE: 97.8 F | SYSTOLIC BLOOD PRESSURE: 124 MMHG | DIASTOLIC BLOOD PRESSURE: 75 MMHG

## 2020-10-21 RX ADMIN — HYDROXYZINE PAMOATE SCH MG: 25 CAPSULE ORAL at 06:06

## 2020-10-21 NOTE — DS
BHS Detox Discharge Summary


Admission Date: 


10/19/20





Discharge Date: 10/21/20





- History


Present History: Alcohol Dependence


Additional Comments: 





25 years old male was admitted on 10/19/20 for alcohol withdrawal sx management 

treated with librium detox regiment


mr burgess insists to leave the detox  unit that "my family is looking for me"


encourage mr burgess to call his family mr burgess refused


mr burgess is alert oriented x 3 speech clearly coherently ambulating steady 

gait 


seen by psychiatrist no medical intervention 





General Appearance: Yes: Thin, not Irritable, mild 


                               Vital Signs - 24 hr











  10/20/20 10/20/20 10/20/20





  12:37 16:32 20:46


 


Temperature 97.5 F L 98.4 F 98.1 F


 


Pulse Rate 64 61 71


 


Respiratory 18 18 18





Rate   


 


Blood Pressure 105/54 L 94/57 L 106/68


 


O2 Sat by Pulse 100  100





Oximetry (%)   














  10/21/20 10/21/20





  06:28 08:48


 


Temperature 97.5 F L 97.8 F


 


Pulse Rate 51 L 88


 


Respiratory 20 18





Rate  


 


Blood Pressure 102/60 124/75


 


O2 Sat by Pulse 100 100





Oximetry (%)  








Anxious


HEENTM: Yes: EOMI, Hearing grossly Normal, Normal ENT Inspection, Normocephalic,

Normal Voice, BREANNA, Pharynx Normal, Tm's normal


Respiratory: Yes: Chest Non-Tender, Lungs Clear, Normal Breath Sounds, No 

Respiratory Distress, No Accessory Muscle Use


Neck: Yes: No masses,lesions,Nodules, Supple, Trachea in good position


Breast: Yes: Within Normal Limits


Cardiology: Yes: Regular Rhythm, S1, S2, Tachycardia


Abdominal: Yes: Normal Bowel Sounds, Non Tender, Flat, Soft


Genitourinary: Yes: Within Normal Limits


Back: Yes: Normal Inspection


Musculoskeletal: Yes: full range of Motion, Gait Steady, Pelvis Stable


Extremities: Yes: Normal Capillary Refill, Normal Inspection, Normal Range of 

Motion, Non-Tender


Neurological: Yes: CNs II-XII NML intact, Fully Oriented, Alert, Motor Strength 

5/5, Normal Mood/Affect, Normal Response


Integumentary: Yes: Normal Color, Dry, Warm


Lymphatic: Yes: Within Normal Limits





Pertinent Past History: 





time for discharge 49 minutes


treatment team met with mr burgess to discuss benefit of librium completion 


mr burgess agrees to consider [facility] as alcohol abuse treatment aftercare 

facility 


mr burgess insists to leave the detox unit to go home with his family 





- Physical Exam Results


Vital Signs: 


                                   Vital Signs











Temperature  97.8 F   10/21/20 08:48


 


Pulse Rate  88   10/21/20 08:48


 


Respiratory Rate  18   10/21/20 08:48


 


Blood Pressure  124/75   10/21/20 08:48


 


O2 Sat by Pulse Oximetry (%)  100   10/21/20 08:48











Pertinent Admission Physical Exam Findings: 





alcohol withdrawal 


                               Vital Signs - 24 hr











  10/20/20 10/20/20 10/20/20





  12:37 16:32 20:46


 


Temperature 97.5 F L 98.4 F 98.1 F


 


Pulse Rate 64 61 71


 


Respiratory 18 18 18





Rate   


 


Blood Pressure 105/54 L 94/57 L 106/68


 


O2 Sat by Pulse 100  100





Oximetry (%)   














  10/21/20 10/21/20





  06:28 08:48


 


Temperature 97.5 F L 97.8 F


 


Pulse Rate 51 L 88


 


Respiratory 20 18





Rate  


 


Blood Pressure 102/60 124/75


 


O2 Sat by Pulse 100 100





Oximetry (%)  








                                Laboratory Tests











  10/19/20 10/19/20 10/19/20





  10:00 10:00 10:00


 


WBC  12.7 H  


 


RBC  4.11  


 


Hgb  12.6  


 


Hct  37.6  


 


MCV  91.5  


 


MCH  30.7  


 


MCHC  33.6  


 


RDW  12.3  


 


Plt Count  363  


 


MPV  7.0 L  


 


Sodium   


 


Potassium   


 


Chloride   


 


Carbon Dioxide   


 


Anion Gap   


 


BUN   


 


Creatinine   


 


Est GFR (CKD-EPI)AfAm   


 


Est GFR (CKD-EPI)NonAf   


 


Random Glucose   


 


Calcium   


 


Total Bilirubin   


 


AST   


 


ALT   


 


Alkaline Phosphatase   


 


Total Protein   


 


Albumin   


 


Syphilis Serology   Non-reactive 


 


COVID-19 ([facility])    Not detected














  10/19/20





  10:35


 


WBC 


 


RBC 


 


Hgb 


 


Hct 


 


MCV 


 


MCH 


 


MCHC 


 


RDW 


 


Plt Count 


 


MPV 


 


Sodium  142


 


Potassium  4.1


 


Chloride  106


 


Carbon Dioxide  29


 


Anion Gap  7 L


 


BUN  13.3


 


Creatinine  1.0


 


Est GFR (CKD-EPI)AfAm  120.70


 


Est GFR (CKD-EPI)NonAf  104.14


 


Random Glucose  91


 


Calcium  9.0


 


Total Bilirubin  0.6


 


AST  19


 


ALT  19


 


Alkaline Phosphatase  73


 


Total Protein  7.2


 


Albumin  3.9


 


Syphilis Serology 


 


COVID-19 ([facility]) 








wbc elevation 


repeat cbc been cancelled 





- Treatment


Hospital Course: Detox Protocol Followed, Responded well, Rehab Referral 

Accepted


Patient has Accepted a Rehab Referral to: [facility]





- Medication


Discharge Medications: 


Ambulatory Orders





NK [No Known Home Medication]  06/15/19 











- Diagnosis


(1) Alcohol dependence with uncomplicated withdrawal


Status: Acute   





(2) Weight loss


Status: Chronic   





(3) Nicotine dependence


Status: Acute   


Qualifiers: 


   Nicotine product type: cigarettes   Substance use status: in withdrawal   

Qualified Code(s): F17.213 - Nicotine dependence, cigarettes, with withdrawal   





(4) Substance induced mood disorder


Status: Suspected   





- AMA


Did Patient Leave Against Medical Advice: Yes





CIWA Score





- CIWA Score


Nausea/Vomitin-Mild Nausea/No Vomiting


Muscle Tremors: 2


Anxiety: 3


Agitation: 0-Normal Activity


Paroxysmal Sweats: No Perspiration


Orientation: 0-Oriented


Tacttile Disturbances: 0-None


Auditory Disturbances: 0-None


Visual Disturbances: 1-Very Mild Sensitivity


Headache: 0-None Present


CIWA-Ar Total Score: 7

## 2021-02-20 ENCOUNTER — HOSPITAL ENCOUNTER (INPATIENT)
Dept: HOSPITAL 74 - YASAS | Age: 26
LOS: 2 days | Discharge: LEFT BEFORE DISCHARGE | End: 2021-02-22
Attending: ALLERGY & IMMUNOLOGY | Admitting: ALLERGY & IMMUNOLOGY
Payer: COMMERCIAL

## 2021-02-20 VITALS — BODY MASS INDEX: 20.8 KG/M2

## 2021-02-20 DIAGNOSIS — U07.1: ICD-10-CM

## 2021-02-20 DIAGNOSIS — F14.20: ICD-10-CM

## 2021-02-20 DIAGNOSIS — F10.282: ICD-10-CM

## 2021-02-20 DIAGNOSIS — Z91.19: ICD-10-CM

## 2021-02-20 DIAGNOSIS — F12.20: ICD-10-CM

## 2021-02-20 DIAGNOSIS — F41.9: ICD-10-CM

## 2021-02-20 DIAGNOSIS — F19.282: ICD-10-CM

## 2021-02-20 DIAGNOSIS — F19.24: ICD-10-CM

## 2021-02-20 DIAGNOSIS — F10.230: Primary | ICD-10-CM

## 2021-02-20 DIAGNOSIS — F32.9: ICD-10-CM

## 2021-02-20 DIAGNOSIS — F17.210: ICD-10-CM

## 2021-02-20 DIAGNOSIS — F91.8: ICD-10-CM

## 2021-02-20 PROCEDURE — C9803 HOPD COVID-19 SPEC COLLECT: HCPCS

## 2021-02-20 PROCEDURE — HZ2ZZZZ DETOXIFICATION SERVICES FOR SUBSTANCE ABUSE TREATMENT: ICD-10-PCS | Performed by: ALLERGY & IMMUNOLOGY

## 2021-02-20 PROCEDURE — U0003 INFECTIOUS AGENT DETECTION BY NUCLEIC ACID (DNA OR RNA); SEVERE ACUTE RESPIRATORY SYNDROME CORONAVIRUS 2 (SARS-COV-2) (CORONAVIRUS DISEASE [COVID-19]), AMPLIFIED PROBE TECHNIQUE, MAKING USE OF HIGH THROUGHPUT TECHNOLOGIES AS DESCRIBED BY CMS-2020-01-R: HCPCS

## 2021-02-20 RX ADMIN — NICOTINE SCH MG: 7 PATCH TRANSDERMAL at 14:20

## 2021-02-20 RX ADMIN — Medication SCH MG: at 22:22

## 2021-02-20 RX ADMIN — HYDROXYZINE PAMOATE SCH: 25 CAPSULE ORAL at 14:20

## 2021-02-20 RX ADMIN — HYDROXYZINE PAMOATE SCH MG: 25 CAPSULE ORAL at 22:22

## 2021-02-20 RX ADMIN — HYDROXYZINE PAMOATE SCH: 25 CAPSULE ORAL at 17:42

## 2021-02-21 RX ADMIN — HYDROXYZINE PAMOATE SCH MG: 25 CAPSULE ORAL at 10:14

## 2021-02-21 RX ADMIN — HYDROXYZINE PAMOATE SCH MG: 25 CAPSULE ORAL at 18:59

## 2021-02-21 RX ADMIN — Medication SCH TAB: at 10:14

## 2021-02-21 RX ADMIN — HYDROXYZINE PAMOATE SCH: 25 CAPSULE ORAL at 06:16

## 2021-02-21 RX ADMIN — HYDROXYZINE PAMOATE SCH MG: 25 CAPSULE ORAL at 22:52

## 2021-02-21 RX ADMIN — NICOTINE SCH MG: 7 PATCH TRANSDERMAL at 10:14

## 2021-02-21 RX ADMIN — Medication SCH MG: at 22:53

## 2021-02-21 RX ADMIN — HYDROXYZINE PAMOATE SCH: 25 CAPSULE ORAL at 13:18

## 2021-02-21 RX ADMIN — Medication SCH MG: at 22:52

## 2021-02-22 VITALS — HEART RATE: 72 BPM | TEMPERATURE: 98 F | SYSTOLIC BLOOD PRESSURE: 137 MMHG | DIASTOLIC BLOOD PRESSURE: 80 MMHG

## 2021-02-22 RX ADMIN — Medication SCH TAB: at 12:03

## 2021-02-22 RX ADMIN — HYDROXYZINE PAMOATE SCH: 25 CAPSULE ORAL at 10:04

## 2021-02-22 RX ADMIN — HYDROXYZINE PAMOATE SCH MG: 25 CAPSULE ORAL at 06:48

## 2021-02-22 RX ADMIN — NICOTINE SCH MG: 7 PATCH TRANSDERMAL at 12:03

## 2021-02-22 RX ADMIN — HYDROXYZINE PAMOATE SCH: 25 CAPSULE ORAL at 14:33

## 2021-04-09 ENCOUNTER — HOSPITAL ENCOUNTER (INPATIENT)
Dept: HOSPITAL 74 - YASAS | Age: 26
LOS: 4 days | Discharge: HOME | End: 2021-04-13
Attending: ALLERGY & IMMUNOLOGY | Admitting: ALLERGY & IMMUNOLOGY
Payer: COMMERCIAL

## 2021-04-09 VITALS — BODY MASS INDEX: 20.9 KG/M2

## 2021-04-09 DIAGNOSIS — F63.9: ICD-10-CM

## 2021-04-09 DIAGNOSIS — F90.9: ICD-10-CM

## 2021-04-09 DIAGNOSIS — F19.24: ICD-10-CM

## 2021-04-09 DIAGNOSIS — F19.280: ICD-10-CM

## 2021-04-09 DIAGNOSIS — F17.210: ICD-10-CM

## 2021-04-09 DIAGNOSIS — F10.230: Primary | ICD-10-CM

## 2021-04-09 DIAGNOSIS — F32.9: ICD-10-CM

## 2021-04-09 DIAGNOSIS — Z86.16: ICD-10-CM

## 2021-04-09 DIAGNOSIS — F12.20: ICD-10-CM

## 2021-04-09 DIAGNOSIS — F41.9: ICD-10-CM

## 2021-04-09 DIAGNOSIS — F14.20: ICD-10-CM

## 2021-04-09 PROCEDURE — C9803 HOPD COVID-19 SPEC COLLECT: HCPCS

## 2021-04-09 PROCEDURE — U0003 INFECTIOUS AGENT DETECTION BY NUCLEIC ACID (DNA OR RNA); SEVERE ACUTE RESPIRATORY SYNDROME CORONAVIRUS 2 (SARS-COV-2) (CORONAVIRUS DISEASE [COVID-19]), AMPLIFIED PROBE TECHNIQUE, MAKING USE OF HIGH THROUGHPUT TECHNOLOGIES AS DESCRIBED BY CMS-2020-01-R: HCPCS

## 2021-04-09 PROCEDURE — HZ2ZZZZ DETOXIFICATION SERVICES FOR SUBSTANCE ABUSE TREATMENT: ICD-10-PCS | Performed by: ALLERGY & IMMUNOLOGY

## 2021-04-09 PROCEDURE — U0005 INFEC AGEN DETEC AMPLI PROBE: HCPCS

## 2021-04-09 RX ADMIN — HYDROXYZINE PAMOATE SCH MG: 25 CAPSULE ORAL at 22:14

## 2021-04-09 RX ADMIN — Medication SCH MG: at 22:14

## 2021-04-09 RX ADMIN — HYDROXYZINE PAMOATE SCH MG: 25 CAPSULE ORAL at 20:03

## 2021-04-10 RX ADMIN — Medication SCH TAB: at 10:25

## 2021-04-10 RX ADMIN — HYDROXYZINE PAMOATE SCH MG: 25 CAPSULE ORAL at 10:26

## 2021-04-10 RX ADMIN — HYDROXYZINE PAMOATE SCH: 25 CAPSULE ORAL at 17:47

## 2021-04-10 RX ADMIN — HYDROXYZINE PAMOATE SCH MG: 25 CAPSULE ORAL at 22:08

## 2021-04-10 RX ADMIN — HYDROXYZINE PAMOATE SCH: 25 CAPSULE ORAL at 13:40

## 2021-04-10 RX ADMIN — NICOTINE SCH MG: 14 PATCH, EXTENDED RELEASE TRANSDERMAL at 10:26

## 2021-04-10 RX ADMIN — HYDROXYZINE PAMOATE SCH: 25 CAPSULE ORAL at 06:29

## 2021-04-10 RX ADMIN — Medication SCH MG: at 22:08

## 2021-04-10 RX ADMIN — Medication PRN MG: at 22:09

## 2021-04-11 RX ADMIN — Medication PRN MG: at 21:50

## 2021-04-11 RX ADMIN — HYDROXYZINE PAMOATE SCH MG: 25 CAPSULE ORAL at 17:33

## 2021-04-11 RX ADMIN — HYDROXYZINE PAMOATE SCH MG: 25 CAPSULE ORAL at 21:49

## 2021-04-11 RX ADMIN — Medication SCH TAB: at 10:21

## 2021-04-11 RX ADMIN — Medication SCH MG: at 21:49

## 2021-04-11 RX ADMIN — HYDROXYZINE PAMOATE SCH: 25 CAPSULE ORAL at 15:07

## 2021-04-11 RX ADMIN — NICOTINE SCH MG: 14 PATCH, EXTENDED RELEASE TRANSDERMAL at 10:23

## 2021-04-11 RX ADMIN — HYDROXYZINE PAMOATE SCH MG: 25 CAPSULE ORAL at 10:22

## 2021-04-11 RX ADMIN — HYDROXYZINE PAMOATE SCH MG: 25 CAPSULE ORAL at 06:07

## 2021-04-12 RX ADMIN — HYDROXYZINE PAMOATE SCH: 25 CAPSULE ORAL at 18:30

## 2021-04-12 RX ADMIN — Medication SCH MG: at 22:16

## 2021-04-12 RX ADMIN — NICOTINE SCH MG: 14 PATCH, EXTENDED RELEASE TRANSDERMAL at 11:16

## 2021-04-12 RX ADMIN — Medication PRN MG: at 22:17

## 2021-04-12 RX ADMIN — Medication SCH TAB: at 11:16

## 2021-04-12 RX ADMIN — HYDROXYZINE PAMOATE SCH: 25 CAPSULE ORAL at 15:02

## 2021-04-12 RX ADMIN — HYDROXYZINE PAMOATE SCH MG: 25 CAPSULE ORAL at 11:15

## 2021-04-12 RX ADMIN — HYDROXYZINE PAMOATE SCH MG: 25 CAPSULE ORAL at 22:17

## 2021-04-12 RX ADMIN — HYDROXYZINE PAMOATE SCH MG: 25 CAPSULE ORAL at 06:54

## 2021-04-13 VITALS — DIASTOLIC BLOOD PRESSURE: 72 MMHG | HEART RATE: 84 BPM | SYSTOLIC BLOOD PRESSURE: 139 MMHG | TEMPERATURE: 98.6 F

## 2021-04-13 RX ADMIN — HYDROXYZINE PAMOATE SCH: 25 CAPSULE ORAL at 15:04

## 2021-04-13 RX ADMIN — NICOTINE SCH MG: 14 PATCH, EXTENDED RELEASE TRANSDERMAL at 10:18

## 2021-04-13 RX ADMIN — Medication SCH TAB: at 10:18

## 2021-04-13 RX ADMIN — HYDROXYZINE PAMOATE SCH MG: 25 CAPSULE ORAL at 05:57

## 2021-04-13 RX ADMIN — HYDROXYZINE PAMOATE SCH MG: 25 CAPSULE ORAL at 17:56

## 2021-04-13 RX ADMIN — HYDROXYZINE PAMOATE SCH MG: 25 CAPSULE ORAL at 10:18

## 2021-06-15 ENCOUNTER — HOSPITAL ENCOUNTER (INPATIENT)
Dept: HOSPITAL 74 - YASAS | Age: 26
LOS: 3 days | Discharge: HOME | End: 2021-06-18
Attending: ALLERGY & IMMUNOLOGY | Admitting: ALLERGY & IMMUNOLOGY
Payer: COMMERCIAL

## 2021-06-15 VITALS — BODY MASS INDEX: 16.9 KG/M2

## 2021-06-15 DIAGNOSIS — F10.230: Primary | ICD-10-CM

## 2021-06-15 DIAGNOSIS — G47.00: ICD-10-CM

## 2021-06-15 DIAGNOSIS — F17.210: ICD-10-CM

## 2021-06-15 DIAGNOSIS — F12.20: ICD-10-CM

## 2021-06-15 DIAGNOSIS — F14.20: ICD-10-CM

## 2021-06-15 PROCEDURE — HZ2ZZZZ DETOXIFICATION SERVICES FOR SUBSTANCE ABUSE TREATMENT: ICD-10-PCS | Performed by: ALLERGY & IMMUNOLOGY

## 2021-06-15 PROCEDURE — U0005 INFEC AGEN DETEC AMPLI PROBE: HCPCS

## 2021-06-15 PROCEDURE — C9803 HOPD COVID-19 SPEC COLLECT: HCPCS

## 2021-06-15 PROCEDURE — U0003 INFECTIOUS AGENT DETECTION BY NUCLEIC ACID (DNA OR RNA); SEVERE ACUTE RESPIRATORY SYNDROME CORONAVIRUS 2 (SARS-COV-2) (CORONAVIRUS DISEASE [COVID-19]), AMPLIFIED PROBE TECHNIQUE, MAKING USE OF HIGH THROUGHPUT TECHNOLOGIES AS DESCRIBED BY CMS-2020-01-R: HCPCS

## 2021-06-15 RX ADMIN — Medication SCH MG: at 22:49

## 2021-06-15 RX ADMIN — Medication SCH MG: at 22:50

## 2021-06-15 RX ADMIN — HYDROXYZINE PAMOATE SCH MG: 25 CAPSULE ORAL at 22:50

## 2021-06-16 LAB
ALBUMIN SERPL-MCNC: 3.5 G/DL (ref 3.4–5)
ALP SERPL-CCNC: 72 U/L (ref 45–117)
ALT SERPL-CCNC: 22 U/L (ref 13–61)
ANION GAP SERPL CALC-SCNC: 6 MMOL/L (ref 8–16)
AST SERPL-CCNC: 16 U/L (ref 15–37)
BILIRUB SERPL-MCNC: 0.3 MG/DL (ref 0.2–1)
BUN SERPL-MCNC: 11 MG/DL (ref 7–18)
CALCIUM SERPL-MCNC: 9 MG/DL (ref 8.5–10.1)
CHLORIDE SERPL-SCNC: 108 MMOL/L (ref 98–107)
CO2 SERPL-SCNC: 27 MMOL/L (ref 21–32)
CREAT SERPL-MCNC: 0.7 MG/DL (ref 0.55–1.3)
DEPRECATED RDW RBC AUTO: 12.5 % (ref 11.9–15.9)
GLUCOSE SERPL-MCNC: 80 MG/DL (ref 74–106)
HCT VFR BLD CALC: 40.1 % (ref 35.4–49)
HGB BLD-MCNC: 13.5 GM/DL (ref 11.7–16.9)
MCH RBC QN AUTO: 30.2 PG (ref 25.7–33.7)
MCHC RBC AUTO-ENTMCNC: 33.7 G/DL (ref 32–35.9)
MCV RBC: 89.5 FL (ref 80–96)
PLATELET # BLD AUTO: 335 10^3/UL (ref 134–434)
PMV BLD: 7 FL (ref 7.5–11.1)
PROT SERPL-MCNC: 6.3 G/DL (ref 6.4–8.2)
RBC # BLD AUTO: 4.48 M/MM3 (ref 4–5.6)
SODIUM SERPL-SCNC: 140 MMOL/L (ref 136–145)
WBC # BLD AUTO: 4.9 K/MM3 (ref 4–10)

## 2021-06-16 RX ADMIN — Medication SCH MG: at 22:13

## 2021-06-16 RX ADMIN — HYDROXYZINE PAMOATE SCH: 25 CAPSULE ORAL at 14:05

## 2021-06-16 RX ADMIN — HYDROXYZINE PAMOATE SCH: 25 CAPSULE ORAL at 18:41

## 2021-06-16 RX ADMIN — HYDROXYZINE PAMOATE SCH MG: 25 CAPSULE ORAL at 05:51

## 2021-06-16 RX ADMIN — Medication SCH: at 22:15

## 2021-06-16 RX ADMIN — HYDROXYZINE PAMOATE SCH MG: 25 CAPSULE ORAL at 22:13

## 2021-06-16 RX ADMIN — Medication SCH TAB: at 11:47

## 2021-06-16 RX ADMIN — HYDROXYZINE PAMOATE SCH: 25 CAPSULE ORAL at 11:54

## 2021-06-17 RX ADMIN — HYDROXYZINE PAMOATE SCH: 25 CAPSULE ORAL at 13:38

## 2021-06-17 RX ADMIN — Medication SCH MG: at 22:26

## 2021-06-17 RX ADMIN — HYDROXYZINE PAMOATE SCH: 25 CAPSULE ORAL at 11:24

## 2021-06-17 RX ADMIN — HYDROXYZINE PAMOATE SCH MG: 25 CAPSULE ORAL at 22:27

## 2021-06-17 RX ADMIN — HYDROXYZINE PAMOATE SCH MG: 25 CAPSULE ORAL at 06:34

## 2021-06-17 RX ADMIN — HYDROXYZINE PAMOATE SCH MG: 25 CAPSULE ORAL at 17:21

## 2021-06-17 RX ADMIN — Medication SCH MG: at 22:27

## 2021-06-17 RX ADMIN — Medication SCH TAB: at 11:23

## 2021-06-18 VITALS — HEART RATE: 105 BPM | TEMPERATURE: 98.6 F | DIASTOLIC BLOOD PRESSURE: 72 MMHG | SYSTOLIC BLOOD PRESSURE: 118 MMHG

## 2021-06-18 RX ADMIN — HYDROXYZINE PAMOATE SCH: 25 CAPSULE ORAL at 06:47

## 2021-06-18 RX ADMIN — Medication SCH TAB: at 10:20

## 2021-06-18 RX ADMIN — HYDROXYZINE PAMOATE SCH MG: 25 CAPSULE ORAL at 10:20

## 2021-09-04 ENCOUNTER — HOSPITAL ENCOUNTER (INPATIENT)
Dept: HOSPITAL 74 - YASAS | Age: 26
LOS: 2 days | Discharge: LEFT BEFORE DISCHARGE | DRG: 770 | End: 2021-09-06
Attending: ALLERGY & IMMUNOLOGY | Admitting: ALLERGY & IMMUNOLOGY
Payer: COMMERCIAL

## 2021-09-04 DIAGNOSIS — F10.230: Primary | ICD-10-CM

## 2021-09-04 DIAGNOSIS — Z86.16: ICD-10-CM

## 2021-09-04 DIAGNOSIS — F17.210: ICD-10-CM

## 2021-09-04 DIAGNOSIS — F12.20: ICD-10-CM

## 2021-09-04 PROCEDURE — U0005 INFEC AGEN DETEC AMPLI PROBE: HCPCS

## 2021-09-04 PROCEDURE — C9803 HOPD COVID-19 SPEC COLLECT: HCPCS

## 2021-09-04 PROCEDURE — U0003 INFECTIOUS AGENT DETECTION BY NUCLEIC ACID (DNA OR RNA); SEVERE ACUTE RESPIRATORY SYNDROME CORONAVIRUS 2 (SARS-COV-2) (CORONAVIRUS DISEASE [COVID-19]), AMPLIFIED PROBE TECHNIQUE, MAKING USE OF HIGH THROUGHPUT TECHNOLOGIES AS DESCRIBED BY CMS-2020-01-R: HCPCS

## 2021-09-04 RX ADMIN — Medication SCH MG: at 22:30

## 2021-09-05 PROCEDURE — HZ2ZZZZ DETOXIFICATION SERVICES FOR SUBSTANCE ABUSE TREATMENT: ICD-10-PCS | Performed by: ALLERGY & IMMUNOLOGY

## 2021-09-05 RX ADMIN — Medication SCH TAB: at 11:52

## 2021-09-05 RX ADMIN — Medication SCH MG: at 22:32

## 2021-09-06 VITALS — DIASTOLIC BLOOD PRESSURE: 76 MMHG | SYSTOLIC BLOOD PRESSURE: 126 MMHG | HEART RATE: 92 BPM | TEMPERATURE: 96.9 F

## 2021-09-06 LAB
ALBUMIN SERPL-MCNC: 3.8 G/DL (ref 3.4–5)
ALP SERPL-CCNC: 73 U/L (ref 45–117)
ALT SERPL-CCNC: 33 U/L (ref 13–61)
ANION GAP SERPL CALC-SCNC: 2 MMOL/L (ref 8–16)
AST SERPL-CCNC: 23 U/L (ref 15–37)
BILIRUB SERPL-MCNC: 0.3 MG/DL (ref 0.2–1)
BUN SERPL-MCNC: 8.5 MG/DL (ref 7–18)
CALCIUM SERPL-MCNC: 9.1 MG/DL (ref 8.5–10.1)
CHLORIDE SERPL-SCNC: 105 MMOL/L (ref 98–107)
CO2 SERPL-SCNC: 33 MMOL/L (ref 21–32)
CREAT SERPL-MCNC: 0.8 MG/DL (ref 0.55–1.3)
DEPRECATED RDW RBC AUTO: 12.9 % (ref 11.9–15.9)
GLUCOSE SERPL-MCNC: 73 MG/DL (ref 74–106)
HCT VFR BLD CALC: 40.2 % (ref 35.4–49)
HGB BLD-MCNC: 14 GM/DL (ref 11.7–16.9)
MCH RBC QN AUTO: 31.5 PG (ref 25.7–33.7)
MCHC RBC AUTO-ENTMCNC: 34.7 G/DL (ref 32–35.9)
MCV RBC: 90.7 FL (ref 80–96)
PLATELET # BLD AUTO: 352 10^3/UL (ref 134–434)
PMV BLD: 7 FL (ref 7.5–11.1)
PROT SERPL-MCNC: 7.3 G/DL (ref 6.4–8.2)
RBC # BLD AUTO: 4.43 M/MM3 (ref 4–5.6)
SODIUM SERPL-SCNC: 141 MMOL/L (ref 136–145)
WBC # BLD AUTO: 5.9 K/MM3 (ref 4–10)

## 2021-09-06 RX ADMIN — Medication SCH TAB: at 10:20

## 2021-12-07 ENCOUNTER — HOSPITAL ENCOUNTER (INPATIENT)
Dept: HOSPITAL 74 - YASAS | Age: 26
LOS: 2 days | Discharge: LEFT BEFORE DISCHARGE | DRG: 770 | End: 2021-12-09
Attending: ALLERGY & IMMUNOLOGY | Admitting: ALLERGY & IMMUNOLOGY
Payer: COMMERCIAL

## 2021-12-07 VITALS — BODY MASS INDEX: 18.9 KG/M2

## 2021-12-07 DIAGNOSIS — F17.210: ICD-10-CM

## 2021-12-07 DIAGNOSIS — F14.20: ICD-10-CM

## 2021-12-07 DIAGNOSIS — F12.20: ICD-10-CM

## 2021-12-07 DIAGNOSIS — F10.230: Primary | ICD-10-CM

## 2021-12-07 DIAGNOSIS — F32.A: ICD-10-CM

## 2021-12-07 DIAGNOSIS — R00.0: ICD-10-CM

## 2021-12-07 DIAGNOSIS — F41.9: ICD-10-CM

## 2021-12-07 PROCEDURE — HZ2ZZZZ DETOXIFICATION SERVICES FOR SUBSTANCE ABUSE TREATMENT: ICD-10-PCS | Performed by: ALLERGY & IMMUNOLOGY

## 2021-12-07 PROCEDURE — U0003 INFECTIOUS AGENT DETECTION BY NUCLEIC ACID (DNA OR RNA); SEVERE ACUTE RESPIRATORY SYNDROME CORONAVIRUS 2 (SARS-COV-2) (CORONAVIRUS DISEASE [COVID-19]), AMPLIFIED PROBE TECHNIQUE, MAKING USE OF HIGH THROUGHPUT TECHNOLOGIES AS DESCRIBED BY CMS-2020-01-R: HCPCS

## 2021-12-07 PROCEDURE — U0005 INFEC AGEN DETEC AMPLI PROBE: HCPCS

## 2021-12-07 PROCEDURE — C9803 HOPD COVID-19 SPEC COLLECT: HCPCS

## 2021-12-08 RX ADMIN — Medication SCH: at 14:00

## 2021-12-08 RX ADMIN — NICOTINE SCH: 14 PATCH, EXTENDED RELEASE TRANSDERMAL at 14:00

## 2021-12-09 VITALS — SYSTOLIC BLOOD PRESSURE: 108 MMHG | HEART RATE: 61 BPM | DIASTOLIC BLOOD PRESSURE: 51 MMHG

## 2021-12-09 VITALS — TEMPERATURE: 97.5 F

## 2021-12-09 RX ADMIN — Medication SCH TAB: at 10:35

## 2021-12-09 RX ADMIN — NICOTINE SCH: 14 PATCH, EXTENDED RELEASE TRANSDERMAL at 10:37

## 2022-08-24 ENCOUNTER — HOSPITAL ENCOUNTER (INPATIENT)
Dept: HOSPITAL 74 - YASAS | Age: 27
LOS: 3 days | Discharge: LEFT BEFORE DISCHARGE | DRG: 770 | End: 2022-08-27
Attending: SURGERY | Admitting: ALLERGY & IMMUNOLOGY
Payer: COMMERCIAL

## 2022-08-24 VITALS — BODY MASS INDEX: 18.2 KG/M2

## 2022-08-24 DIAGNOSIS — Z28.310: ICD-10-CM

## 2022-08-24 DIAGNOSIS — Z28.21: ICD-10-CM

## 2022-08-24 DIAGNOSIS — F12.20: ICD-10-CM

## 2022-08-24 DIAGNOSIS — F10.230: Primary | ICD-10-CM

## 2022-08-24 DIAGNOSIS — F14.20: ICD-10-CM

## 2022-08-24 DIAGNOSIS — F17.210: ICD-10-CM

## 2022-08-24 PROCEDURE — U0005 INFEC AGEN DETEC AMPLI PROBE: HCPCS

## 2022-08-24 PROCEDURE — U0003 INFECTIOUS AGENT DETECTION BY NUCLEIC ACID (DNA OR RNA); SEVERE ACUTE RESPIRATORY SYNDROME CORONAVIRUS 2 (SARS-COV-2) (CORONAVIRUS DISEASE [COVID-19]), AMPLIFIED PROBE TECHNIQUE, MAKING USE OF HIGH THROUGHPUT TECHNOLOGIES AS DESCRIBED BY CMS-2020-01-R: HCPCS

## 2022-08-25 LAB
ALBUMIN SERPL-MCNC: 3.6 G/DL (ref 3.4–5)
ALP SERPL-CCNC: 74 U/L (ref 45–117)
ALT SERPL-CCNC: 29 U/L (ref 13–61)
ANION GAP SERPL CALC-SCNC: 4 MMOL/L (ref 8–16)
AST SERPL-CCNC: 25 U/L (ref 15–37)
BILIRUB SERPL-MCNC: 0.2 MG/DL (ref 0.2–1)
BUN SERPL-MCNC: 6.7 MG/DL (ref 7–18)
CALCIUM SERPL-MCNC: 8.5 MG/DL (ref 8.5–10.1)
CHLORIDE SERPL-SCNC: 104 MMOL/L (ref 98–107)
CO2 SERPL-SCNC: 27 MMOL/L (ref 21–32)
CREAT SERPL-MCNC: 0.8 MG/DL (ref 0.55–1.3)
DEPRECATED RDW RBC AUTO: 12.3 % (ref 11.9–15.9)
GLUCOSE SERPL-MCNC: 97 MG/DL (ref 74–106)
HCT VFR BLD CALC: 40.9 % (ref 35.4–49)
HGB BLD-MCNC: 14.1 GM/DL (ref 11.7–16.9)
MCH RBC QN AUTO: 30.5 PG (ref 25.7–33.7)
MCHC RBC AUTO-ENTMCNC: 34.4 G/DL (ref 32–35.9)
MCV RBC: 88.6 FL (ref 80–96)
PLATELET # BLD AUTO: 300 10^3/UL (ref 134–434)
PMV BLD: 7.4 FL (ref 7.5–11.1)
PROT SERPL-MCNC: 7.3 G/DL (ref 6.4–8.2)
RBC # BLD AUTO: 4.61 M/MM3 (ref 4–5.6)
SODIUM SERPL-SCNC: 136 MMOL/L (ref 136–145)
WBC # BLD AUTO: 4.6 K/MM3 (ref 4–10)

## 2022-08-25 PROCEDURE — HZ2ZZZZ DETOXIFICATION SERVICES FOR SUBSTANCE ABUSE TREATMENT: ICD-10-PCS | Performed by: SURGERY

## 2022-08-25 RX ADMIN — Medication SCH MG: at 22:24

## 2022-08-25 RX ADMIN — HYDROXYZINE PAMOATE SCH MG: 25 CAPSULE ORAL at 18:04

## 2022-08-25 RX ADMIN — HYDROXYZINE PAMOATE SCH MG: 25 CAPSULE ORAL at 22:24

## 2022-08-25 RX ADMIN — Medication SCH TAB: at 12:43

## 2022-08-25 RX ADMIN — HYDROXYZINE PAMOATE SCH: 25 CAPSULE ORAL at 14:04

## 2022-08-25 RX ADMIN — NICOTINE SCH MG: 21 PATCH TRANSDERMAL at 12:44

## 2022-08-26 VITALS — RESPIRATION RATE: 18 BRPM

## 2022-08-26 RX ADMIN — Medication SCH TAB: at 10:39

## 2022-08-26 RX ADMIN — HYDROXYZINE PAMOATE SCH MG: 25 CAPSULE ORAL at 22:28

## 2022-08-26 RX ADMIN — HYDROXYZINE PAMOATE SCH: 25 CAPSULE ORAL at 18:52

## 2022-08-26 RX ADMIN — HYDROXYZINE PAMOATE SCH: 25 CAPSULE ORAL at 13:45

## 2022-08-26 RX ADMIN — Medication SCH MG: at 22:28

## 2022-08-26 RX ADMIN — NICOTINE SCH: 21 PATCH TRANSDERMAL at 10:39

## 2022-08-26 RX ADMIN — HYDROXYZINE PAMOATE SCH MG: 25 CAPSULE ORAL at 10:39

## 2022-08-26 RX ADMIN — HYDROXYZINE PAMOATE SCH: 25 CAPSULE ORAL at 08:17

## 2022-08-27 VITALS — DIASTOLIC BLOOD PRESSURE: 75 MMHG | TEMPERATURE: 97.7 F | SYSTOLIC BLOOD PRESSURE: 119 MMHG | HEART RATE: 68 BPM

## 2022-08-27 RX ADMIN — HYDROXYZINE PAMOATE SCH: 25 CAPSULE ORAL at 14:47

## 2022-08-27 RX ADMIN — NICOTINE SCH: 21 PATCH TRANSDERMAL at 10:50

## 2022-08-27 RX ADMIN — HYDROXYZINE PAMOATE SCH MG: 25 CAPSULE ORAL at 05:32

## 2022-08-27 RX ADMIN — HYDROXYZINE PAMOATE SCH MG: 25 CAPSULE ORAL at 10:50

## 2022-08-27 RX ADMIN — Medication SCH TAB: at 10:50
